# Patient Record
Sex: FEMALE | Race: WHITE | NOT HISPANIC OR LATINO | URBAN - METROPOLITAN AREA
[De-identification: names, ages, dates, MRNs, and addresses within clinical notes are randomized per-mention and may not be internally consistent; named-entity substitution may affect disease eponyms.]

---

## 2020-07-16 ENCOUNTER — OFFICE VISIT (OUTPATIENT)
Dept: URBAN - METROPOLITAN AREA CLINIC 63 | Facility: CLINIC | Age: 70
End: 2020-07-16

## 2022-07-09 ENCOUNTER — TELEPHONE ENCOUNTER (OUTPATIENT)
Dept: URBAN - METROPOLITAN AREA CLINIC 121 | Facility: CLINIC | Age: 72
End: 2022-07-09

## 2022-07-09 RX ORDER — ALBUTEROL SULFATE 90 UG/1
AEROSOL, METERED RESPIRATORY (INHALATION)
Refills: 0 | OUTPATIENT
Start: 2019-03-13 | End: 2020-02-28

## 2022-07-09 RX ORDER — OMEPRAZOLE 40 MG/1
CAPSULE, DELAYED RELEASE ORAL ONCE A DAY
Refills: 0 | OUTPATIENT
Start: 2020-02-15 | End: 2020-02-28

## 2022-07-09 RX ORDER — DOXYCYCLINE HYCLATE 100 MG/1
TABLET ORAL ONCE A DAY
Refills: 0 | OUTPATIENT
Start: 2020-02-25 | End: 2020-02-28

## 2022-07-09 RX ORDER — BACLOFEN 20 MG/1
TABLET ORAL ONCE A DAY
Refills: 0 | OUTPATIENT
Start: 2020-02-18 | End: 2020-02-28

## 2022-07-09 RX ORDER — BACLOFEN 20 MG/1
TABLET ORAL ONCE A DAY
Refills: 0 | OUTPATIENT
Start: 2020-02-28 | End: 2020-02-28

## 2022-07-09 RX ORDER — SIMVASTATIN 20 MG/1
TABLET, FILM COATED ORAL ONCE A DAY
Refills: 0 | OUTPATIENT
Start: 2020-02-14 | End: 2020-02-28

## 2022-07-09 RX ORDER — LOSARTAN POTASSIUM 50 MG/1
TABLET, FILM COATED ORAL ONCE A DAY
Refills: 0 | OUTPATIENT
Start: 2019-12-11 | End: 2020-02-28

## 2022-07-10 ENCOUNTER — TELEPHONE ENCOUNTER (OUTPATIENT)
Dept: URBAN - METROPOLITAN AREA CLINIC 121 | Facility: CLINIC | Age: 72
End: 2022-07-10

## 2022-07-10 RX ORDER — DOXYCYCLINE HYCLATE 100 MG/1
TABLET ORAL TWICE A DAY
Refills: 0 | Status: ACTIVE | COMMUNITY
Start: 2020-02-25

## 2022-07-10 RX ORDER — PREDNISONE 20 MG/1
TABLET ORAL ONCE A DAY
Refills: 0 | Status: ACTIVE | COMMUNITY
Start: 2020-02-25

## 2022-07-10 RX ORDER — ALBUTEROL SULFATE 90 UG/1
AEROSOL, METERED RESPIRATORY (INHALATION)
Refills: 0 | Status: ACTIVE | COMMUNITY
Start: 2020-02-28

## 2022-07-10 RX ORDER — LOSARTAN POTASSIUM 50 MG/1
TABLET, FILM COATED ORAL ONCE A DAY
Refills: 0 | Status: ACTIVE | COMMUNITY
Start: 2020-02-28

## 2022-07-10 RX ORDER — SIMVASTATIN 20 MG/1
TABLET, FILM COATED ORAL ONCE A DAY
Refills: 0 | Status: ACTIVE | COMMUNITY
Start: 2020-02-28

## 2022-07-10 RX ORDER — BACLOFEN 20 MG/1
TABLET ORAL AS NEEDED
Refills: 0 | Status: ACTIVE | COMMUNITY
Start: 2020-02-28

## 2022-07-10 RX ORDER — OMEPRAZOLE 40 MG/1
CAPSULE, DELAYED RELEASE ORAL ONCE A DAY
Refills: 0 | Status: ACTIVE | COMMUNITY
Start: 2020-02-28

## 2023-11-07 ENCOUNTER — OFFICE VISIT (OUTPATIENT)
Dept: URBAN - METROPOLITAN AREA CLINIC 60 | Facility: CLINIC | Age: 73
End: 2023-11-07
Payer: COMMERCIAL

## 2023-11-07 VITALS
HEIGHT: 64 IN | SYSTOLIC BLOOD PRESSURE: 140 MMHG | HEART RATE: 57 BPM | WEIGHT: 143 LBS | BODY MASS INDEX: 24.41 KG/M2 | OXYGEN SATURATION: 99 % | DIASTOLIC BLOOD PRESSURE: 66 MMHG | TEMPERATURE: 97.2 F | RESPIRATION RATE: 12 BRPM

## 2023-11-07 DIAGNOSIS — R93.3 GASTROINTESTINAL TRACT IMAGING ABNORMALITY: ICD-10-CM

## 2023-11-07 DIAGNOSIS — K74.69 OTHER CIRRHOSIS OF LIVER: ICD-10-CM

## 2023-11-07 DIAGNOSIS — K21.9 GASTROESOPHAGEAL REFLUX DISEASE WITHOUT ESOPHAGITIS: ICD-10-CM

## 2023-11-07 DIAGNOSIS — Z90.49 STATUS POST PARTIAL RESECTION OF COLON: ICD-10-CM

## 2023-11-07 DIAGNOSIS — Z12.11 SCREEN FOR COLON CANCER: ICD-10-CM

## 2023-11-07 PROBLEM — 442182001: Status: ACTIVE | Noted: 2023-11-07

## 2023-11-07 PROBLEM — 266435005: Status: ACTIVE | Noted: 2023-11-07

## 2023-11-07 PROBLEM — 19943007: Status: ACTIVE | Noted: 2023-11-07

## 2023-11-07 PROBLEM — 305058001: Status: ACTIVE | Noted: 2023-11-07

## 2023-11-07 PROBLEM — 428305005: Status: ACTIVE | Noted: 2023-11-07

## 2023-11-07 PROCEDURE — 99204 OFFICE O/P NEW MOD 45 MIN: CPT | Performed by: INTERNAL MEDICINE

## 2023-11-07 RX ORDER — OMEPRAZOLE 40 MG/1
CAPSULE, DELAYED RELEASE ORAL
Qty: 90 APPLICATOR | Refills: 1 | Status: ACTIVE | COMMUNITY

## 2023-11-07 RX ORDER — DULAGLUTIDE 1.5 MG/.5ML
INJECT 0.5 ML INTO THE SKIN EVERY 7 DAYS INJECTION, SOLUTION SUBCUTANEOUS
Qty: 6 MILLILITER | Refills: 0 | Status: ACTIVE | COMMUNITY

## 2023-11-07 RX ORDER — EVOLOCUMAB 140 MG/ML
INJECT 1 ML INTO THE SKIN EVERY 14 DAYS INJECTION, SOLUTION SUBCUTANEOUS
Qty: 6 MILLILITER | Refills: 0 | Status: ACTIVE | COMMUNITY

## 2023-11-07 RX ORDER — LOSARTAN POTASSIUM 50 MG/1
TABLET, FILM COATED ORAL ONCE A DAY
Refills: 0 | Status: ACTIVE | COMMUNITY
Start: 2020-02-28

## 2023-11-07 RX ORDER — ALBUTEROL SULFATE 90 UG/1
AEROSOL, METERED RESPIRATORY (INHALATION)
Refills: 0 | Status: ACTIVE | COMMUNITY
Start: 2020-02-28

## 2023-11-07 RX ORDER — MECLIZINE 12.5 MG/1
TAKE 1 TABLET BY MOUTH EVERY 6 HOURS AS NEEDED FOR DIZZINESS FOR UP TO 30 DAYS TABLET ORAL
Qty: 30 EACH | Refills: 0 | Status: ACTIVE | COMMUNITY

## 2023-11-07 RX ORDER — VALACYCLOVIR HYDROCHLORIDE 1 G/1
TABLET, FILM COATED ORAL
Qty: 21 TABLET | Refills: 0 | Status: ACTIVE | COMMUNITY

## 2023-11-07 RX ORDER — MONTELUKAST SODIUM 10 MG/1
TAKE 1 TABLET BY MOUTH NIGHTLY TABLET, FILM COATED ORAL
Qty: 90 EACH | Refills: 1 | Status: ACTIVE | COMMUNITY

## 2023-11-07 RX ORDER — BUDESONIDE AND FORMOTEROL FUMARATE DIHYDRATE 160; 4.5 UG/1; UG/1
AEROSOL RESPIRATORY (INHALATION)
Qty: 30.6 GRAM | Refills: 1 | Status: ACTIVE | COMMUNITY

## 2023-11-07 RX ORDER — FLUTICASONE PROPIONATE 50 UG/1
SPRAY, METERED NASAL
Qty: 16 GRAM | Refills: 0 | Status: ACTIVE | COMMUNITY

## 2023-11-07 RX ORDER — GABAPENTIN 300 MG/1
TAKE 1 BY MOUTH 3 TIMES A DAY CAPSULE ORAL
Qty: 270 EACH | Refills: 1 | Status: ACTIVE | COMMUNITY

## 2023-11-07 RX ORDER — BACLOFEN 20 MG/1
TABLET ORAL AS NEEDED
Refills: 0 | Status: ACTIVE | COMMUNITY
Start: 2020-02-28

## 2023-11-07 RX ORDER — EPINEPHRINE 0.3 MG/.3ML
INJECT 1 (ONE) SYRINGE AS NEEDED FOR SEVERE REACTION INJECTION INTRAMUSCULAR
Qty: 2 EACH | Refills: 1 | Status: ACTIVE | COMMUNITY

## 2023-11-07 NOTE — HPI-TODAY'S VISIT:
Virginia is a pleasant 73-year-old female who was last seen in 2020. She has a history of chronic reflux, remote history of diverticulitis in 2007 that led to a segmental colon resection after colonoscopy. History of cholecystectomy, fatty liver and pancreatic calcification on imaging. She has been referred here for the management of her liver disease. Ultrasound elastography noted S3/F4 She was last seen in 2020. At that time she was dealing with significant recurrent sinus infections and chronic cough and was felt to be at high risk for endoscopy with sedation. We obtained a barium swallow which was unremarkable and requested her recently done Cologuard test results. She was lost to follow-up  Referred here for evaluation of cirrhosis. She had an MRI in 2021 that was also suspicious for cirrhosis with no focal hepatic lesions and also noted was coarse calcifications of pancreas suggesting chronic pancreatitis. She recalls having 1 episode of pancreatitis a few years ago. She denies any history of alcohol abuse. She drinks only at [employer] celebration. She blames Her diabetes and pancreatitis and now cirrhosis on COVID Is possible she developed diabetes from her chronic pancreatitis which then led to her fatty liver and nonalcoholic cirrhosis She denies any active gastrointestinal complaints except for mild epigastric discomfort. Denies any dysphagia except to pills. Denies any rectal bleeding melena constipation or diarrhea or any unintentional weight loss loss of appetite. Denies any chronic abdominal pain or bloating or diarrhea. No family history of pancreatitis or cirrhosis.

## 2023-11-07 NOTE — HPI-PREVIOUS IMAGING
Ultrasound elastography 10/2023: S3/F4  MRI abdomen with and without contrast July 2021: 18.2 cm liver span, moderate diffuse hepatic steatosis, mild surface nodularity suspicious for cirrhosis, no focal hepatic lesions, subcentimeter focus of heterogeneous early arterial enhancement in the left lobe segment 3 consistent with transient vascular shunting. Cholecystectomy, mild splenomegaly MRI abdomen with and without contrast July 2021: Pancreas is diffusely atrophic with coarse calcifications consistent with sequela of chronic pancreatitis. MRI abdomen with and without contrast July 2021: Mild inflammatory changes at the splenic flexure suggesting mild diverticulitis.  CAT scan without contrast 2018: Fatty liver, pancreatic calcification, cholecystectomy Barium swallow 2020: Small to moderate-sized hiatal hernia, no stricture, barium tablet passes freely through the GE junction

## 2023-11-07 NOTE — HPI-PREVIOUS LABS
Labs August 2023: Normal electrolytes, normal renal function, serum albumin 4.3, total bilirubin 0.5, AST/ALT 40/50, alkaline phosphatase 153, serum albumin 4.3 Hemoglobin A1c 6.4, total cholesterol 128, triglycerides 212 (H), LDL 49, TSH 0.693 (normal), Labs March 2023: WBC 5.4, hemoglobin 14.4 g, MCV 89, platelets 94 (L)  Hep C antibody is nonreactive in 2021

## 2023-11-07 NOTE — PHYSICAL EXAM GASTROINTESTINAL
soft, nontender, nondistended , no masses palpable , normal bowel sounds , Liver felt 5 cm below rcm - tender/ no ascites / no asterixis/ some dilated veins visualized on the skin of hrr abdomen

## 2023-11-14 ENCOUNTER — LAB OUTSIDE AN ENCOUNTER (OUTPATIENT)
Dept: URBAN - METROPOLITAN AREA CLINIC 60 | Facility: CLINIC | Age: 73
End: 2023-11-14

## 2024-01-08 LAB
A/G RATIO: 1.5
ABSOLUTE BASOPHILS: 59
ABSOLUTE EOSINOPHILS: 100
ABSOLUTE LYMPHOCYTES: 1422
ABSOLUTE MONOCYTES: 454
ABSOLUTE NEUTROPHILS: 3865
ACTIN (SMOOTH MUSCLE) ANTIBODY (IGG): <20
AFP, SERUM, TUMOR MARKER: 3.3
ALBUMIN: 4.3
ALKALINE PHOSPHATASE: 109
ALT (SGPT): 42
ANA SCREEN, IFA: NEGATIVE
AST (SGOT): 40
BASOPHILS: 1
BILIRUBIN, TOTAL: 0.7
BUN/CREATININE RATIO: (no result)
BUN: 12
CALCIUM: 10.1
CARBON DIOXIDE, TOTAL: 28
CHLORIDE: 100
CHOL/HDLC RATIO: 2.2
CHOLESTEROL, TOTAL: 103
CREATININE: 0.87
EGFR: 70
EOSINOPHILS: 1.7
FERRITIN, SERUM: 47
GLOBULIN, TOTAL: 2.9
GLUCOSE: 215
HDL CHOLESTEROL: 47
HEMATOCRIT: 43.8
HEMOGLOBIN A1C: 9
HEMOGLOBIN: 15
HEP B CORE AB, IGM: (no result)
HEPATITIS A AB, TOTAL: REACTIVE
HEPATITIS B SURFACE ANTIGEN: (no result)
HEPATITIS C ANTIBODY: (no result)
INR: 1.1
IRON BIND.CAP.(TIBC): 385
IRON SATURATION: 20
IRON: 76
LDL CHOLESTEROL CALC: 33
LKM-1 ANTIBODY (IGG): <=20
LYMPHOCYTES: 24.1
MCH: 30.7
MCHC: 34.2
MCV: 89.6
MITOCHONDRIAL (M2) ANTIBODY: <=20
MONOCYTES: 7.7
MPV: 12.9
NEUTROPHILS: 65.5
NON HDL CHOLESTEROL: 56
PLATELET COUNT: 93
POTASSIUM: 4.3
PROTEIN, TOTAL: 7.2
PT: 11
RDW: 12.3
RED BLOOD CELL COUNT: 4.89
RHEUMATOID FACTOR: <14
SJOGREN'S ANTIBODY (SS-A): (no result)
SJOGREN'S ANTIBODY (SS-B): (no result)
SODIUM: 139
TRIGLYCERIDES: 146
WHITE BLOOD CELL COUNT: 5.9

## 2024-01-11 ENCOUNTER — OFFICE VISIT (OUTPATIENT)
Dept: URBAN - METROPOLITAN AREA CLINIC 63 | Facility: CLINIC | Age: 74
End: 2024-01-11
Payer: COMMERCIAL

## 2024-01-11 ENCOUNTER — LAB OUTSIDE AN ENCOUNTER (OUTPATIENT)
Dept: URBAN - METROPOLITAN AREA CLINIC 63 | Facility: CLINIC | Age: 74
End: 2024-01-11

## 2024-01-11 VITALS
TEMPERATURE: 96.7 F | HEIGHT: 64 IN | HEART RATE: 70 BPM | WEIGHT: 143 LBS | OXYGEN SATURATION: 96 % | BODY MASS INDEX: 24.41 KG/M2 | DIASTOLIC BLOOD PRESSURE: 65 MMHG | SYSTOLIC BLOOD PRESSURE: 135 MMHG

## 2024-01-11 DIAGNOSIS — Z12.11 SCREEN FOR COLON CANCER: ICD-10-CM

## 2024-01-11 DIAGNOSIS — K74.69 OTHER CIRRHOSIS OF LIVER: ICD-10-CM

## 2024-01-11 DIAGNOSIS — R93.3 GASTROINTESTINAL TRACT IMAGING ABNORMALITY: ICD-10-CM

## 2024-01-11 DIAGNOSIS — K21.9 GASTROESOPHAGEAL REFLUX DISEASE WITHOUT ESOPHAGITIS: ICD-10-CM

## 2024-01-11 DIAGNOSIS — Z90.49 STATUS POST PARTIAL RESECTION OF COLON: ICD-10-CM

## 2024-01-11 PROBLEM — 235953007: Status: ACTIVE | Noted: 2024-01-11

## 2024-01-11 PROCEDURE — 99215 OFFICE O/P EST HI 40 MIN: CPT | Performed by: INTERNAL MEDICINE

## 2024-01-11 RX ORDER — GABAPENTIN 300 MG/1
TAKE 1 BY MOUTH 3 TIMES A DAY CAPSULE ORAL
Qty: 270 EACH | Refills: 1 | Status: ACTIVE | COMMUNITY

## 2024-01-11 RX ORDER — VALACYCLOVIR HYDROCHLORIDE 1 G/1
TABLET, FILM COATED ORAL
Qty: 21 TABLET | Refills: 0 | Status: ACTIVE | COMMUNITY

## 2024-01-11 RX ORDER — ALBUTEROL SULFATE 90 UG/1
AEROSOL, METERED RESPIRATORY (INHALATION)
Refills: 0 | Status: ACTIVE | COMMUNITY
Start: 2020-02-28

## 2024-01-11 RX ORDER — DULAGLUTIDE 1.5 MG/.5ML
INJECT 0.5 ML INTO THE SKIN EVERY 7 DAYS INJECTION, SOLUTION SUBCUTANEOUS
Qty: 6 MILLILITER | Refills: 0 | Status: ACTIVE | COMMUNITY

## 2024-01-11 RX ORDER — MONTELUKAST SODIUM 10 MG/1
TAKE 1 TABLET BY MOUTH NIGHTLY TABLET, FILM COATED ORAL
Qty: 90 EACH | Refills: 1 | Status: ACTIVE | COMMUNITY

## 2024-01-11 RX ORDER — BUDESONIDE AND FORMOTEROL FUMARATE DIHYDRATE 160; 4.5 UG/1; UG/1
AEROSOL RESPIRATORY (INHALATION)
Qty: 30.6 GRAM | Refills: 1 | Status: ACTIVE | COMMUNITY

## 2024-01-11 RX ORDER — MECLIZINE 12.5 MG/1
TAKE 1 TABLET BY MOUTH EVERY 6 HOURS AS NEEDED FOR DIZZINESS FOR UP TO 30 DAYS TABLET ORAL
Qty: 30 EACH | Refills: 0 | Status: ACTIVE | COMMUNITY

## 2024-01-11 RX ORDER — SOD SULF/POT CHLORIDE/MAG SULF 1.479 G
12 TABLETS TABLET ORAL
Qty: 24 | Refills: 0 | OUTPATIENT
Start: 2024-01-11 | End: 2024-01-12

## 2024-01-11 RX ORDER — EVOLOCUMAB 140 MG/ML
INJECT 1 ML INTO THE SKIN EVERY 14 DAYS INJECTION, SOLUTION SUBCUTANEOUS
Qty: 6 MILLILITER | Refills: 0 | Status: ACTIVE | COMMUNITY

## 2024-01-11 RX ORDER — BACLOFEN 20 MG/1
TABLET ORAL AS NEEDED
Refills: 0 | Status: ACTIVE | COMMUNITY
Start: 2020-02-28

## 2024-01-11 RX ORDER — OMEPRAZOLE 40 MG/1
CAPSULE, DELAYED RELEASE ORAL
Qty: 90 APPLICATOR | Refills: 1 | Status: ACTIVE | COMMUNITY

## 2024-01-11 RX ORDER — EPINEPHRINE 0.3 MG/.3ML
INJECT 1 (ONE) SYRINGE AS NEEDED FOR SEVERE REACTION INJECTION INTRAMUSCULAR
Qty: 2 EACH | Refills: 1 | Status: ACTIVE | COMMUNITY

## 2024-01-11 RX ORDER — FLUTICASONE PROPIONATE 50 UG/1
SPRAY, METERED NASAL
Qty: 16 GRAM | Refills: 0 | Status: ACTIVE | COMMUNITY

## 2024-01-11 RX ORDER — LOSARTAN POTASSIUM 50 MG/1
TABLET, FILM COATED ORAL ONCE A DAY
Refills: 0 | Status: ACTIVE | COMMUNITY
Start: 2020-02-28

## 2024-01-11 NOTE — HPI-TODAY'S VISIT:
Virginia is a pleasant 73-year-old female who was last seen in 2020. 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 She had extensive serological workup which was reviewed-refer below. And unremarkable except for thrombocytopenia. Imaging studies note no ascites and evidence of chronic pancreatitis. She takes Trulicity for her diabetes. She reports symptoms of feeling full, decreased appetite and feels like " everything is pushing up" . Reports intermittent constipation and she takes magnesium citrate which apparently does not seem to be working right now. She is not taking herbal teas with good response. MiraLAX did not help. Eventually will need to be on lactulose. Denies any chronic abdominal pain or bloating or diarrhea. She also reports seeing some bright red blood with bowel movements. Describes intermittent dysphagia to both solids and liquids that is nonprogressive. barium swallow - neg in 2020 Ultrasound January 2024 as noted below.  She has a history of chronic reflux, remote history of diverticulitis in 2007 that led to a segmental colon resection after colonoscopy. History of cholecystectomy, fatty liver and pancreatic calcification on imaging.  Ultrasound elastography noted S3/F4  Cologuard test results not available.    MRI in 2021 that was also suspicious for cirrhosis with no focal hepatic lesions and also noted was coarse calcifications of pancreas suggesting chronic pancreatitis. She recalls having 1 episode of pancreatitis a few years ago. She denies any history of alcohol abuse. She drinks only at [employer] Year[employer] celebration. She blames Her diabetes and pancreatitis and now cirrhosis on COVID Is possible she developed diabetes from her chronic pancreatitis which then led to her fatty liver and nonalcoholic cirrhosis No family history of pancreatitis or cirrhosis.

## 2024-01-11 NOTE — HPI-PREVIOUS LABS
Labs January 2024: WBC 5.9, hemoglobin 15 g, MCV 89, platelets 93 (L), Serum iron 76, TIBC 3 8520% saturation, ferritin 47 BUN 12 creatinine 0.87, Total bilirubin 0.7, AST 40 (H), ALT 42 (H), alkaline phosphatase 109, serum albumin 4.3, prothrombin time 11.0, INR 1.1, Alpha-fetoprotein 3.3, Autoimmune serologies including SHANEKA, AMA, ASMA, anti-LK M all unremarkable Immune to hepatitis A, hepatitis B and C negative Hemoglobin A1c 9.0 (H), normal lipid profile except for a HDL of 47 (L).   Labs August 2023: Normal electrolytes, normal renal function, serum albumin 4.3, total bilirubin 0.5, AST/ALT 40/50, alkaline phosphatase 153, serum albumin 4.3 Hemoglobin A1c 6.4, total cholesterol 128, triglycerides 212 (H), LDL 49, TSH 0.693 (normal), Labs March 2023: WBC 5.4, hemoglobin 14.4 g, MCV 89, platelets 94 (L)  Hep C antibody is nonreactive in 2021

## 2024-01-11 NOTE — HPI-PREVIOUS IMAGING
Ultrasound abdomen January 2024: Cholecystectomy, normal ducts, CBD 6 mm, borderline enlarged liver, hyperechoic echogenicity, heterogeneous, nodular shape, liver span 17.9 cm, splenomegaly (15.1 cm)  Ultrasound elastography 10/2023: S3/F4  MRI abdomen with and without contrast July 2021: 18.2 cm liver span, moderate diffuse hepatic steatosis, mild surface nodularity suspicious for cirrhosis, no focal hepatic lesions, subcentimeter focus of heterogeneous early arterial enhancement in the left lobe segment 3 consistent with transient vascular shunting. Cholecystectomy, mild splenomegaly MRI abdomen with and without contrast July 2021: Pancreas is diffusely atrophic with coarse calcifications consistent with sequela of chronic pancreatitis. MRI abdomen with and without contrast July 2021: Mild inflammatory changes at the splenic flexure suggesting mild diverticulitis.  CAT scan without contrast 2018: Fatty liver, pancreatic calcification, cholecystectomy Barium swallow 2020: Small to moderate-sized hiatal hernia, no stricture, barium tablet passes freely through the GE junction

## 2024-01-11 NOTE — PHYSICAL EXAM GASTROINTESTINAL
soft, nontender, nondistended , no masses palpable , normal bowel sounds , Liver felt 4 cm below rcm - tender/ no ascites / NO FLUID THRILL/ some dilated veins visualized on the skin of hrr abdomen

## 2024-01-18 ENCOUNTER — LAB OUTSIDE AN ENCOUNTER (OUTPATIENT)
Dept: URBAN - METROPOLITAN AREA CLINIC 63 | Facility: CLINIC | Age: 74
End: 2024-01-18

## 2024-01-27 LAB
IGG, SUBCLASS 1: 465
IGG, SUBCLASS 2: 321
IGG, SUBCLASS 3: 24
IGG, SUBCLASS 4: 34.3
IMMUNOGLOBULIN G, QN, SERUM: 851

## 2024-02-07 ENCOUNTER — COLON (OUTPATIENT)
Dept: URBAN - METROPOLITAN AREA SURGERY CENTER 4 | Facility: SURGERY CENTER | Age: 74
End: 2024-02-07
Payer: COMMERCIAL

## 2024-02-07 ENCOUNTER — LAB (OUTPATIENT)
Dept: URBAN - METROPOLITAN AREA CLINIC 4 | Facility: CLINIC | Age: 74
End: 2024-02-07
Payer: COMMERCIAL

## 2024-02-07 DIAGNOSIS — K57.30 DIVERTICULOSIS OF LARGE INTESTINE WITHOUT PERFORATION OR ABSCESS WITHOUT BLEEDING: ICD-10-CM

## 2024-02-07 DIAGNOSIS — Z12.11 ENCOUNTER FOR SCREENING FOR MALIGNANT NEOPLASM OF COLON: ICD-10-CM

## 2024-02-07 DIAGNOSIS — K63.5 POLYP OF ASCENDING COLON, UNSPECIFIED TYPE: ICD-10-CM

## 2024-02-07 DIAGNOSIS — D12.5 BENIGN NEOPLASM OF SIGMOID COLON: ICD-10-CM

## 2024-02-07 DIAGNOSIS — K64.2 THIRD DEGREE HEMORRHOIDS: ICD-10-CM

## 2024-02-07 PROCEDURE — 45385 COLONOSCOPY W/LESION REMOVAL: CPT | Performed by: INTERNAL MEDICINE

## 2024-02-07 PROCEDURE — 88305 TISSUE EXAM BY PATHOLOGIST: CPT | Performed by: PATHOLOGY

## 2024-02-07 PROCEDURE — 45380 COLONOSCOPY AND BIOPSY: CPT | Performed by: INTERNAL MEDICINE

## 2024-02-07 RX ORDER — MONTELUKAST SODIUM 10 MG/1
TAKE 1 TABLET BY MOUTH NIGHTLY TABLET, FILM COATED ORAL
Qty: 90 EACH | Refills: 1 | Status: ACTIVE | COMMUNITY

## 2024-02-07 RX ORDER — EPINEPHRINE 0.3 MG/.3ML
INJECT 1 (ONE) SYRINGE AS NEEDED FOR SEVERE REACTION INJECTION INTRAMUSCULAR
Qty: 2 EACH | Refills: 1 | Status: ACTIVE | COMMUNITY

## 2024-02-07 RX ORDER — GABAPENTIN 300 MG/1
TAKE 1 BY MOUTH 3 TIMES A DAY CAPSULE ORAL
Qty: 270 EACH | Refills: 1 | Status: ACTIVE | COMMUNITY

## 2024-02-07 RX ORDER — OMEPRAZOLE 40 MG/1
CAPSULE, DELAYED RELEASE ORAL
Qty: 90 APPLICATOR | Refills: 1 | Status: ACTIVE | COMMUNITY

## 2024-02-07 RX ORDER — LOSARTAN POTASSIUM 50 MG/1
TABLET, FILM COATED ORAL ONCE A DAY
Refills: 0 | Status: ACTIVE | COMMUNITY
Start: 2020-02-28

## 2024-02-07 RX ORDER — BACLOFEN 20 MG/1
TABLET ORAL AS NEEDED
Refills: 0 | Status: ACTIVE | COMMUNITY
Start: 2020-02-28

## 2024-02-07 RX ORDER — DULAGLUTIDE 1.5 MG/.5ML
INJECT 0.5 ML INTO THE SKIN EVERY 7 DAYS INJECTION, SOLUTION SUBCUTANEOUS
Qty: 6 MILLILITER | Refills: 0 | Status: ACTIVE | COMMUNITY

## 2024-02-07 RX ORDER — VALACYCLOVIR HYDROCHLORIDE 1 G/1
TABLET, FILM COATED ORAL
Qty: 21 TABLET | Refills: 0 | Status: ACTIVE | COMMUNITY

## 2024-02-07 RX ORDER — BUDESONIDE AND FORMOTEROL FUMARATE DIHYDRATE 160; 4.5 UG/1; UG/1
AEROSOL RESPIRATORY (INHALATION)
Qty: 30.6 GRAM | Refills: 1 | Status: ACTIVE | COMMUNITY

## 2024-02-07 RX ORDER — ALBUTEROL SULFATE 90 UG/1
AEROSOL, METERED RESPIRATORY (INHALATION)
Refills: 0 | Status: ACTIVE | COMMUNITY
Start: 2020-02-28

## 2024-02-07 RX ORDER — MECLIZINE 12.5 MG/1
TAKE 1 TABLET BY MOUTH EVERY 6 HOURS AS NEEDED FOR DIZZINESS FOR UP TO 30 DAYS TABLET ORAL
Qty: 30 EACH | Refills: 0 | Status: ACTIVE | COMMUNITY

## 2024-02-07 RX ORDER — EVOLOCUMAB 140 MG/ML
INJECT 1 ML INTO THE SKIN EVERY 14 DAYS INJECTION, SOLUTION SUBCUTANEOUS
Qty: 6 MILLILITER | Refills: 0 | Status: ACTIVE | COMMUNITY

## 2024-02-07 RX ORDER — FLUTICASONE PROPIONATE 50 UG/1
SPRAY, METERED NASAL
Qty: 16 GRAM | Refills: 0 | Status: ACTIVE | COMMUNITY

## 2024-02-26 ENCOUNTER — EUS (OUTPATIENT)
Dept: URBAN - METROPOLITAN AREA MEDICAL CENTER 3 | Facility: MEDICAL CENTER | Age: 74
End: 2024-02-26

## 2024-02-26 RX ORDER — BUDESONIDE AND FORMOTEROL FUMARATE DIHYDRATE 160; 4.5 UG/1; UG/1
AEROSOL RESPIRATORY (INHALATION)
Qty: 30.6 GRAM | Refills: 1 | Status: ACTIVE | COMMUNITY

## 2024-02-26 RX ORDER — MECLIZINE 12.5 MG/1
TAKE 1 TABLET BY MOUTH EVERY 6 HOURS AS NEEDED FOR DIZZINESS FOR UP TO 30 DAYS TABLET ORAL
Qty: 30 EACH | Refills: 0 | Status: ACTIVE | COMMUNITY

## 2024-02-26 RX ORDER — EVOLOCUMAB 140 MG/ML
INJECT 1 ML INTO THE SKIN EVERY 14 DAYS INJECTION, SOLUTION SUBCUTANEOUS
Qty: 6 MILLILITER | Refills: 0 | Status: ACTIVE | COMMUNITY

## 2024-02-26 RX ORDER — VALACYCLOVIR HYDROCHLORIDE 1 G/1
TABLET, FILM COATED ORAL
Qty: 21 TABLET | Refills: 0 | Status: ACTIVE | COMMUNITY

## 2024-02-26 RX ORDER — BACLOFEN 20 MG/1
TABLET ORAL AS NEEDED
Refills: 0 | Status: ACTIVE | COMMUNITY
Start: 2020-02-28

## 2024-02-26 RX ORDER — DULAGLUTIDE 1.5 MG/.5ML
INJECT 0.5 ML INTO THE SKIN EVERY 7 DAYS INJECTION, SOLUTION SUBCUTANEOUS
Qty: 6 MILLILITER | Refills: 0 | Status: ACTIVE | COMMUNITY

## 2024-02-26 RX ORDER — ALBUTEROL SULFATE 90 UG/1
AEROSOL, METERED RESPIRATORY (INHALATION)
Refills: 0 | Status: ACTIVE | COMMUNITY
Start: 2020-02-28

## 2024-02-26 RX ORDER — MONTELUKAST SODIUM 10 MG/1
TAKE 1 TABLET BY MOUTH NIGHTLY TABLET, FILM COATED ORAL
Qty: 90 EACH | Refills: 1 | Status: ACTIVE | COMMUNITY

## 2024-02-26 RX ORDER — GABAPENTIN 300 MG/1
TAKE 1 BY MOUTH 3 TIMES A DAY CAPSULE ORAL
Qty: 270 EACH | Refills: 1 | Status: ACTIVE | COMMUNITY

## 2024-02-26 RX ORDER — EPINEPHRINE 0.3 MG/.3ML
INJECT 1 (ONE) SYRINGE AS NEEDED FOR SEVERE REACTION INJECTION INTRAMUSCULAR
Qty: 2 EACH | Refills: 1 | Status: ACTIVE | COMMUNITY

## 2024-02-26 RX ORDER — LOSARTAN POTASSIUM 50 MG/1
TABLET, FILM COATED ORAL ONCE A DAY
Refills: 0 | Status: ACTIVE | COMMUNITY
Start: 2020-02-28

## 2024-02-26 RX ORDER — OMEPRAZOLE 40 MG/1
CAPSULE, DELAYED RELEASE ORAL
Qty: 90 APPLICATOR | Refills: 1 | Status: ACTIVE | COMMUNITY

## 2024-02-26 RX ORDER — FLUTICASONE PROPIONATE 50 UG/1
SPRAY, METERED NASAL
Qty: 16 GRAM | Refills: 0 | Status: ACTIVE | COMMUNITY

## 2024-03-21 ENCOUNTER — OV EP (OUTPATIENT)
Dept: URBAN - METROPOLITAN AREA CLINIC 63 | Facility: CLINIC | Age: 74
End: 2024-03-21
Payer: COMMERCIAL

## 2024-03-21 VITALS
DIASTOLIC BLOOD PRESSURE: 64 MMHG | HEART RATE: 84 BPM | SYSTOLIC BLOOD PRESSURE: 122 MMHG | TEMPERATURE: 97.9 F | HEIGHT: 64 IN | OXYGEN SATURATION: 97 % | BODY MASS INDEX: 23.56 KG/M2 | WEIGHT: 138 LBS

## 2024-03-21 DIAGNOSIS — Z90.49 STATUS POST PARTIAL RESECTION OF COLON: ICD-10-CM

## 2024-03-21 DIAGNOSIS — K74.69 OTHER CIRRHOSIS OF LIVER: ICD-10-CM

## 2024-03-21 DIAGNOSIS — K21.9 GASTROESOPHAGEAL REFLUX DISEASE WITHOUT ESOPHAGITIS: ICD-10-CM

## 2024-03-21 DIAGNOSIS — R93.3 GASTROINTESTINAL TRACT IMAGING ABNORMALITY: ICD-10-CM

## 2024-03-21 DIAGNOSIS — K86.1 IDIOPATHIC CHRONIC PANCREATITIS: ICD-10-CM

## 2024-03-21 DIAGNOSIS — Z12.11 SCREEN FOR COLON CANCER: ICD-10-CM

## 2024-03-21 PROCEDURE — 99214 OFFICE O/P EST MOD 30 MIN: CPT | Performed by: INTERNAL MEDICINE

## 2024-03-21 RX ORDER — EMPAGLIFLOZIN 25 MG/1
1 TABLET TABLET, FILM COATED ORAL ONCE A DAY
Status: ACTIVE | COMMUNITY

## 2024-03-21 RX ORDER — LOSARTAN POTASSIUM 50 MG/1
TABLET, FILM COATED ORAL ONCE A DAY
Refills: 0 | Status: ACTIVE | COMMUNITY
Start: 2020-02-28

## 2024-03-21 RX ORDER — ALBUTEROL SULFATE 90 UG/1
AEROSOL, METERED RESPIRATORY (INHALATION)
Refills: 0 | Status: ACTIVE | COMMUNITY
Start: 2020-02-28

## 2024-03-21 RX ORDER — PANCRELIPASE 36000; 180000; 114000 [USP'U]/1; [USP'U]/1; [USP'U]/1
2 PILLS WITH MEAL AND 1 PILL WITH SNACK CAPSULE, DELAYED RELEASE PELLETS ORAL AS NEEDED
Qty: 90 | Refills: 2 | OUTPATIENT
Start: 2024-03-21 | End: 2024-06-19

## 2024-03-21 RX ORDER — MONTELUKAST SODIUM 10 MG/1
TAKE 1 TABLET BY MOUTH NIGHTLY TABLET, FILM COATED ORAL
Qty: 90 EACH | Refills: 1 | Status: ACTIVE | COMMUNITY

## 2024-03-21 RX ORDER — OMEPRAZOLE 40 MG/1
CAPSULE, DELAYED RELEASE ORAL
Qty: 90 APPLICATOR | Refills: 1 | Status: ACTIVE | COMMUNITY

## 2024-03-21 RX ORDER — EVOLOCUMAB 140 MG/ML
INJECT 1 ML INTO THE SKIN EVERY 14 DAYS INJECTION, SOLUTION SUBCUTANEOUS
Qty: 6 MILLILITER | Refills: 0 | Status: ACTIVE | COMMUNITY

## 2024-03-21 RX ORDER — MECLIZINE 12.5 MG/1
TAKE 1 TABLET BY MOUTH EVERY 6 HOURS AS NEEDED FOR DIZZINESS FOR UP TO 30 DAYS TABLET ORAL
Qty: 30 EACH | Refills: 0 | Status: ACTIVE | COMMUNITY

## 2024-03-21 RX ORDER — BACLOFEN 20 MG/1
TABLET ORAL AS NEEDED
Refills: 0 | Status: ACTIVE | COMMUNITY
Start: 2020-02-28

## 2024-03-21 RX ORDER — EPINEPHRINE 0.3 MG/.3ML
INJECT 1 (ONE) SYRINGE AS NEEDED FOR SEVERE REACTION INJECTION INTRAMUSCULAR
Qty: 2 EACH | Refills: 1 | Status: ACTIVE | COMMUNITY

## 2024-03-21 RX ORDER — VALACYCLOVIR HYDROCHLORIDE 1 G/1
TABLET, FILM COATED ORAL
Qty: 21 TABLET | Refills: 0 | Status: ACTIVE | COMMUNITY

## 2024-03-21 RX ORDER — GABAPENTIN 300 MG/1
TAKE 1 BY MOUTH 3 TIMES A DAY CAPSULE ORAL
Qty: 270 EACH | Refills: 1 | Status: ACTIVE | COMMUNITY

## 2024-03-21 RX ORDER — FLUTICASONE PROPIONATE 50 UG/1
SPRAY, METERED NASAL
Qty: 16 GRAM | Refills: 0 | Status: ACTIVE | COMMUNITY

## 2024-03-21 NOTE — HPI-PREVIOUS PROCEDURES
Endoscopic ultrasound Dr. Hollingsworth February 2024: Small esophageal varices in the distal esophagus, moderate portal hypertensive gastropathy, no gastric varices, normal duodenum-no specimens collected and no variceal banding performed. Endoscopic ultrasound noted findings consistent with chronic pancreatitis with several pancreatic calcifications, few small cystic lesions in the pancreas body and tail measuring up to 5 mm in size,-recommended follow-up MRI imaging in 6 months.  Colonoscopy February 2024, Dr. Huggins: Somewhat difficult colonoscopy due to significant looping. Abdominal pressure was not required. Quality of the prep was adequate to identify polyps. Polyps in the cecum x 2 4 mm and 10 mm resected with cold snare-tubular adenomas, 12 mm hepatic flexure adenoma and a 7 mm sigmoid colon adenoma, sigmoid diverticulosis, patent end-to-end colocolonic anastomosis present at 12 cm from the anal verge, grade 3 hemorrhoids-recall recommended in 3 years  Colonoscopy 2007-[place]-no report.

## 2024-03-21 NOTE — HPI-TODAY'S VISIT:
Have Your Spot(S) Been Treated In The Past?: has not been treated Virginia is here today in follow-up. Colonoscopy noted adenomatous polyps and she will be recommended a recall in 3 years. EGD/EUS done by Dr. Schwarz noted evidence of chronic pancreatitis but no other suspicious lesions. Etiology of her chronic pancreatitis is yet unclear. There is no history of alcohol excess. Medications may be playing a role here. She was taking Trulicity for 3 years. IgG4 came back normal. There is no history of hypertriglyceridemia. Imaging studies and EUS have not documented any ductal abnormalities like pancreas divisum. History of cholecystectomy. She denies any family history of pancreatitis. She reports feeling bloated and distended. "I feel like I am 8 months pregnant ". She denies any symptoms of diarrhea or weight loss. Denies abdominal pain. Denies rectal bleeding or melena.    history of compensated cirrhosis possibly secondary to Hinson. She had extensive serological workup which was reviewed-refer below. And unremarkable except for thrombocytopenia. Imaging studies note no ascites and there is evidence of chronic pancreatitis.  history of diverticulitis in 2007 that led to a segmental colon resection after colonoscopy.  Ultrasound January 2024 as noted below. Ultrasound elastography noted S3/F4     MRI in 2021 that was also suspicious for cirrhosis with no focal hepatic lesions and also noted was coarse calcifications of pancreas suggesting chronic pancreatitis. She recalls having 1 episode of pancreatitis a few years ago.  She blames Her diabetes and pancreatitis and now cirrhosis on COVID Is possible she developed diabetes from her chronic pancreatitis which then led to her fatty liver and nonalcoholic cirrhosis No family history of pancreatitis or cirrhosis. Hpi Title: Evaluation of Skin Lesions Year Removed: 1900

## 2024-03-21 NOTE — HPI-PREVIOUS LABS
Labs January 2024: IgG subclass 4 within normal range   Labs January 2024: WBC 5.9, hemoglobin 15 g, MCV 89, platelets 93 (L), Serum iron 76, TIBC 3 8520% saturation, ferritin 47 BUN 12 creatinine 0.87, Total bilirubin 0.7, AST 40 (H), ALT 42 (H), alkaline phosphatase 109, serum albumin 4.3, prothrombin time 11.0, INR 1.1, Alpha-fetoprotein 3.3, Autoimmune serologies including SHANEKA, AMA, ASMA, anti-LK M all unremarkable Immune to hepatitis A, hepatitis B and C negative Hemoglobin A1c 9.0 (H), normal lipid profile except for a HDL of 47 (L).   Labs August 2023: Normal electrolytes, normal renal function, serum albumin 4.3, total bilirubin 0.5, AST/ALT 40/50, alkaline phosphatase 153, serum albumin 4.3 Hemoglobin A1c 6.4, total cholesterol 128, triglycerides 212 (H), LDL 49, TSH 0.693 (normal), Labs March 2023: WBC 5.4, hemoglobin 14.4 g, MCV 89, platelets 94 (L)  Hep C antibody is nonreactive in 2021
No

## 2025-01-07 ENCOUNTER — TELEPHONE ENCOUNTER (OUTPATIENT)
Dept: URBAN - METROPOLITAN AREA CLINIC 63 | Facility: CLINIC | Age: 75
End: 2025-01-07

## 2025-01-07 RX ORDER — PANCRELIPASE 36000; 180000; 114000 [USP'U]/1; [USP'U]/1; [USP'U]/1
2 PILLS WITH MEAL AND 1 PILL WITH SNACK CAPSULE, DELAYED RELEASE PELLETS ORAL AS NEEDED
Qty: 540 | Refills: 1
Start: 2024-03-21 | End: 2025-07-06

## 2025-04-02 ENCOUNTER — OFFICE VISIT (OUTPATIENT)
Dept: URBAN - METROPOLITAN AREA CLINIC 63 | Facility: CLINIC | Age: 75
End: 2025-04-02

## 2025-04-02 ENCOUNTER — P2P PATIENT RECORD (OUTPATIENT)
Age: 75
End: 2025-04-02

## 2025-04-02 RX ORDER — LOSARTAN POTASSIUM 50 MG/1
TABLET, FILM COATED ORAL ONCE A DAY
Refills: 0 | COMMUNITY
Start: 2020-02-28

## 2025-04-02 RX ORDER — EPINEPHRINE 0.3 MG/.3ML
INJECT 1 (ONE) SYRINGE AS NEEDED FOR SEVERE REACTION INJECTION INTRAMUSCULAR
Qty: 2 EACH | Refills: 1 | COMMUNITY

## 2025-04-02 RX ORDER — EVOLOCUMAB 140 MG/ML
INJECT 1 ML INTO THE SKIN EVERY 14 DAYS INJECTION, SOLUTION SUBCUTANEOUS
Qty: 6 MILLILITER | Refills: 0 | COMMUNITY

## 2025-04-02 RX ORDER — MONTELUKAST SODIUM 10 MG/1
TAKE 1 TABLET BY MOUTH NIGHTLY TABLET, FILM COATED ORAL
Qty: 90 EACH | Refills: 1 | COMMUNITY

## 2025-04-02 RX ORDER — FLUTICASONE PROPIONATE 50 UG/1
SPRAY, METERED NASAL
Qty: 16 GRAM | Refills: 0 | COMMUNITY

## 2025-04-02 RX ORDER — ALBUTEROL SULFATE 90 UG/1
AEROSOL, METERED RESPIRATORY (INHALATION)
Refills: 0 | COMMUNITY
Start: 2020-02-28

## 2025-04-02 RX ORDER — MECLIZINE 12.5 MG/1
TAKE 1 TABLET BY MOUTH EVERY 6 HOURS AS NEEDED FOR DIZZINESS FOR UP TO 30 DAYS TABLET ORAL
Qty: 30 EACH | Refills: 0 | COMMUNITY

## 2025-04-02 RX ORDER — GABAPENTIN 300 MG/1
TAKE 1 BY MOUTH 3 TIMES A DAY CAPSULE ORAL
Qty: 270 EACH | Refills: 1 | COMMUNITY

## 2025-04-02 RX ORDER — OMEPRAZOLE 40 MG/1
CAPSULE, DELAYED RELEASE ORAL
Qty: 90 APPLICATOR | Refills: 1 | COMMUNITY

## 2025-04-02 RX ORDER — VALACYCLOVIR HYDROCHLORIDE 1 G/1
TABLET, FILM COATED ORAL
Qty: 21 TABLET | Refills: 0 | COMMUNITY

## 2025-04-02 RX ORDER — EMPAGLIFLOZIN 25 MG/1
1 TABLET TABLET, FILM COATED ORAL ONCE A DAY
COMMUNITY

## 2025-04-02 RX ORDER — PANCRELIPASE 36000; 180000; 114000 [USP'U]/1; [USP'U]/1; [USP'U]/1
2 PILLS WITH MEAL AND 1 PILL WITH SNACK CAPSULE, DELAYED RELEASE PELLETS ORAL AS NEEDED
Qty: 540 | Refills: 1 | COMMUNITY
Start: 2024-03-21 | End: 2025-07-06

## 2025-04-02 RX ORDER — BACLOFEN 20 MG/1
TABLET ORAL AS NEEDED
Refills: 0 | COMMUNITY
Start: 2020-02-28

## 2025-04-02 NOTE — PHYSICAL EXAM HENT:
Head,  normocephalic,  atraumatic,  Face,  Face within normal limits,  Ears,  External ears within normal limits,  Nose/Nasopharynx,  External nose  normal appearance,  nares patent,  no nasal discharge,  Mouth and Throat,  Oral cavity appearance normal,  Lips,  Appearance normal
Oriented - self; Oriented - place; Oriented - time

## 2025-04-02 NOTE — HPI-TODAY'S VISIT:
74-year-old female, patient of Dr. Huggins with hypertension, diabetes, asthma, complicated sigmoid diverticulitis s/p resection in 2007, GRIMES cirrhosis, chronic pancreatitis, GERD presents to the office for evaluation of abdominal pain.  She was last seen in the office in March 2024 by Dr. Huggins.  She reported abdominal bloating and distention at the time.  She had no other GI complaints.  She was referred to  gastroenterology at [facility] to try to determine the cause of chronic pancreatitis.  She had an EUS with Dr. Hollingsworth in February 2020 for which showed evidence of chronic pancreatitis but no other suspicious lesions or evidence of pancreas divisum.  She has no history of hypertriglyceridemia and IgG4 was normal.  She has no history of excess alcohol intake or a family history of any problems with the pancreas.  She had previously been on Trulicity for diabetes but that was discontinued.  We have not received any records from [facility].  She presents back to the office today She is currently on Creon 36K lipase units, 2 capsules with meals and 1 capsule with snacks. Virginia is here today in follow-up.    Ultrasound January 2024 as noted below. Ultrasound elastography noted S3/F4     MRI in 2021 that was also suspicious for cirrhosis with no focal hepatic lesions and also noted was coarse calcifications of pancreas suggesting chronic pancreatitis. She recalls having 1 episode of pancreatitis a few years ago.  She blames Her diabetes and pancreatitis and now cirrhosis on COVID Is possible she developed diabetes from her chronic pancreatitis which then led to her fatty liver and nonalcoholic cirrhosis No family history of pancreatitis or cirrhosis.

## 2025-04-02 NOTE — HPI-PREVIOUS LABS
Labs January 2024: IgG subclass 4 within normal range   Labs January 2024: WBC 5.9, hemoglobin 15 g, MCV 89, platelets 93 (L), Serum iron 76, TIBC 3 8520% saturation, ferritin 47 BUN 12 creatinine 0.87, Total bilirubin 0.7, AST 40 (H), ALT 42 (H), alkaline phosphatase 109, serum albumin 4.3, prothrombin time 11.0, INR 1.1, Alpha-fetoprotein 3.3, Autoimmune serologies including SHANEKA, AMA, ASMA, anti-LK M all unremarkable Immune to hepatitis A, hepatitis B and C negative Hemoglobin A1c 9.0 (H), normal lipid profile except for a HDL of 47 (L).   Labs August 2023: Normal electrolytes, normal renal function, serum albumin 4.3, total bilirubin 0.5, AST/ALT 40/50, alkaline phosphatase 153, serum albumin 4.3 Hemoglobin A1c 6.4, total cholesterol 128, triglycerides 212 (H), LDL 49, TSH 0.693 (normal), Labs March 2023: WBC 5.4, hemoglobin 14.4 g, MCV 89, platelets 94 (L)  Hep C antibody is nonreactive in 2021

## 2025-04-02 NOTE — HPI-PREVIOUS PROCEDURES
Endoscopic ultrasound Dr. Hollingsworth February 2024: Small esophageal varices in the distal esophagus, moderate portal hypertensive gastropathy, no gastric varices, normal duodenum-no specimens collected and no variceal banding performed. Endoscopic ultrasound noted findings consistent with chronic pancreatitis with several pancreatic calcifications, few small cystic lesions in the pancreas body and tail measuring up to 5 mm in size,-recommended follow-up MRI imaging in 6 months.  Colonoscopy February 2024, Dr. Huggins: Somewhat difficult colonoscopy due to significant looping. Abdominal pressure was not required. Quality of the prep was adequate to identify polyps. Polyps in the cecum x 2 4 mm and 10 mm resected with cold snare-tubular adenomas, 12 mm hepatic flexure adenoma and a 7 mm sigmoid colon adenoma, sigmoid diverticulosis, patent end-to-end colocolonic anastomosis present at 12 cm from the anal verge, grade 3 hemorrhoids-recall recommended in 3 years  Colonoscopy 2007-[place]-no report.

## 2025-04-09 ENCOUNTER — OFFICE VISIT (OUTPATIENT)
Dept: URBAN - METROPOLITAN AREA CLINIC 63 | Facility: CLINIC | Age: 75
End: 2025-04-09
Payer: COMMERCIAL

## 2025-04-09 ENCOUNTER — LAB OUTSIDE AN ENCOUNTER (OUTPATIENT)
Dept: URBAN - METROPOLITAN AREA CLINIC 63 | Facility: CLINIC | Age: 75
End: 2025-04-09

## 2025-04-09 ENCOUNTER — DASHBOARD ENCOUNTERS (OUTPATIENT)
Age: 75
End: 2025-04-09

## 2025-04-09 DIAGNOSIS — K21.9 ACID REFLUX: ICD-10-CM

## 2025-04-09 DIAGNOSIS — R10.10 UPPER ABDOMINAL PAIN: ICD-10-CM

## 2025-04-09 DIAGNOSIS — R93.3 GASTROINTESTINAL TRACT IMAGING ABNORMALITY: ICD-10-CM

## 2025-04-09 DIAGNOSIS — K86.1 IDIOPATHIC CHRONIC PANCREATITIS: ICD-10-CM

## 2025-04-09 DIAGNOSIS — Z86.0101 PERSONAL HISTORY OF ADENOMATOUS AND SERRATED COLON POLYPS: ICD-10-CM

## 2025-04-09 DIAGNOSIS — R13.19 ESOPHAGEAL DYSPHAGIA: ICD-10-CM

## 2025-04-09 DIAGNOSIS — K74.69 OTHER CIRRHOSIS OF LIVER: ICD-10-CM

## 2025-04-09 DIAGNOSIS — Z90.49 STATUS POST PARTIAL RESECTION OF COLON: ICD-10-CM

## 2025-04-09 PROCEDURE — 99214 OFFICE O/P EST MOD 30 MIN: CPT

## 2025-04-09 RX ORDER — MECLIZINE 12.5 MG/1
TAKE 1 TABLET BY MOUTH EVERY 6 HOURS AS NEEDED FOR DIZZINESS FOR UP TO 30 DAYS TABLET ORAL
Qty: 30 EACH | Refills: 0 | Status: ACTIVE | COMMUNITY

## 2025-04-09 RX ORDER — FLUTICASONE PROPIONATE 50 UG/1
SPRAY, METERED NASAL
Qty: 16 GRAM | Refills: 0 | Status: ACTIVE | COMMUNITY

## 2025-04-09 RX ORDER — ALBUTEROL SULFATE 90 UG/1
AEROSOL, METERED RESPIRATORY (INHALATION)
Refills: 0 | Status: ACTIVE | COMMUNITY
Start: 2020-02-28

## 2025-04-09 RX ORDER — PANCRELIPASE 36000; 180000; 114000 [USP'U]/1; [USP'U]/1; [USP'U]/1
2 PILLS WITH MEAL AND 1 PILL WITH SNACK CAPSULE, DELAYED RELEASE PELLETS ORAL AS NEEDED
Qty: 540 | Refills: 1 | Status: ACTIVE | COMMUNITY
Start: 2024-03-21 | End: 2025-07-06

## 2025-04-09 RX ORDER — GABAPENTIN 300 MG/1
TAKE 1 BY MOUTH 3 TIMES A DAY CAPSULE ORAL
Qty: 270 EACH | Refills: 1 | Status: ACTIVE | COMMUNITY

## 2025-04-09 RX ORDER — VALACYCLOVIR HYDROCHLORIDE 1 G/1
TABLET, FILM COATED ORAL
Qty: 21 TABLET | Refills: 0 | Status: ACTIVE | COMMUNITY

## 2025-04-09 RX ORDER — EPINEPHRINE 0.3 MG/.3ML
INJECT 1 (ONE) SYRINGE AS NEEDED FOR SEVERE REACTION INJECTION INTRAMUSCULAR
Qty: 2 EACH | Refills: 1 | Status: ACTIVE | COMMUNITY

## 2025-04-09 RX ORDER — BACLOFEN 20 MG/1
TABLET ORAL AS NEEDED
Refills: 0 | Status: ACTIVE | COMMUNITY
Start: 2020-02-28

## 2025-04-09 RX ORDER — OMEPRAZOLE 40 MG/1
CAPSULE, DELAYED RELEASE ORAL
Qty: 90 APPLICATOR | Refills: 1 | Status: ACTIVE | COMMUNITY

## 2025-04-09 RX ORDER — EVOLOCUMAB 140 MG/ML
INJECT 1 ML INTO THE SKIN EVERY 14 DAYS INJECTION, SOLUTION SUBCUTANEOUS
Qty: 6 MILLILITER | Refills: 0 | Status: ACTIVE | COMMUNITY

## 2025-04-09 RX ORDER — LOSARTAN POTASSIUM 50 MG/1
TABLET, FILM COATED ORAL ONCE A DAY
Refills: 0 | Status: ACTIVE | COMMUNITY
Start: 2020-02-28

## 2025-04-09 RX ORDER — MONTELUKAST SODIUM 10 MG/1
TAKE 1 TABLET BY MOUTH NIGHTLY TABLET, FILM COATED ORAL
Qty: 90 EACH | Refills: 1 | Status: ACTIVE | COMMUNITY

## 2025-04-09 NOTE — HPI-PREVIOUS PROCEDURES
Colonoscopy at [facility] with Dr. Huggins 2/7/2024 colonoscopy difficult due to significant looping bowel prep adequate Consistent with nonthrombosed external and internal hemorrhoids (grade 3) 4 mm polyp in cecum 10 mm polyp in cecum 12 mm polyp at hepatic flexure 7 mm polyp at sigmoid colon Diverticulosis in sigmoid colon Patent end to end colocolonic anastomosis with healthy-appearing mucosa All polyps consistent with tubular adenoma  Endoscopic ultrasound Dr. Hollingsworth February 2024: Small esophageal varices in the distal esophagus, moderate portal hypertensive gastropathy, no gastric varices, normal duodenum-no specimens collected and no variceal banding performed. Endoscopic ultrasound noted findings consistent with chronic pancreatitis with several pancreatic calcifications, few small cystic lesions in the pancreas body and tail measuring up to 5 mm in size,-recommended follow-up MRI imaging in 6 months.   Colonoscopy 2007-[place]-no report.

## 2025-04-09 NOTE — HPI-ZZZTODAY'S VISIT
Patient is a very pleasant 74-year-old female who presents for colonoscopy screening.  She is a patient of Dr. Huggins.  Last seen 3/1/2024.  Past medical history significant for adenomatous colon polyps, chronic pancreatitis, history of compensated cirrhosis secondary to GRIMES, GERD, hypertension, diabetes, asthma, recurrent sinusitis, complicated sigmoid diverticulitis with colon resection 2007.  Past surgical history reviewed.  Last colonoscopy 2/7/2024.  She was last seen in the office in March 2024 by Dr. Huggins. She reported abdominal bloating and distention at the time. She had no other GI complaints. She was referred to gastroenterology at [facility] to try to determine the cause of chronic pancreatitis. She had an EUS with Dr. Hollingsworth in February 2024 which showed evidence of chronic pancreatitis but no other suspicious lesions or evidence of pancreas divisum. She has no history of hypertriglyceridemia and IgG4 was normal. She has no history of excess alcohol intake or a family history of any problems with the pancreas. She had previously been on Trulicity for diabetes but that was discontinued. She is currently on Creon 36K lipase units, 2 capsules with meals and 1 capsule with snacks. Ultrasound January 2024 as noted below. Ultrasound elastography noted S3/F4  MRI in 2021 that was also suspicious for cirrhosis with no focal hepatic lesions and also noted was coarse calcifications of pancreas suggesting chronic pancreatitis. She recalls having 1 episode of pancreatitis a few years ago.  She blames Her diabetes and pancreatitis and now cirrhosis on COVID Is possible she developed diabetes from her chronic pancreatitis which then led to her fatty liver and nonalcoholic cirrhosis No family history of pancreatitis or cirrhosis.  Patient presents with her  for complaint of dysphagia.  She relays for several months she has had progressive solid and liquid dysphagia.  She has no difficulty initiating swallow but after swallowing she feels as though her food sits in her sternum for some time prior to going down into her stomach.  She will get pain that radiates to her back when this happens.  She has early satiety as well.  She has never had anything like this previously and has not had a workup for this.  She has chronic acid reflux for which she takes omeprazole 40 mg with good efficacy.  She denies requiring Heimlich maneuver.  She denies dyspepsia, breakthrough symptoms of pyrosis with omeprazole use, unintentional weight loss, melena, hematochezia, change in bowel habits.  She admits her mom had dysphagia when she was older as well.  She is curious if there is any correlation. In regard to her pancreatitis history and cirrhosis, she admits she was unable to establish with tertiary care.  She had to reschedule 1 time due to her  having an appointment at the same time and was unable to get an appointment again because they were continuously booked out for many months at a time.  She also  states the study was published with [employer] corroborating her theory that COVID may be a precursor to pancreatitis and cirrhosis.  Patient denies jaundice, pruritus, change in mentation, increased confusion, abdominal distention, recent weight gain, or sleep disturbances.

## 2025-05-07 ENCOUNTER — LAB OUTSIDE AN ENCOUNTER (OUTPATIENT)
Dept: URBAN - METROPOLITAN AREA CLINIC 63 | Facility: CLINIC | Age: 75
End: 2025-05-07

## 2025-07-17 ENCOUNTER — P2P PATIENT RECORD (OUTPATIENT)
Age: 75
End: 2025-07-17

## 2025-08-12 ENCOUNTER — TELEPHONE ENCOUNTER (OUTPATIENT)
Dept: URBAN - METROPOLITAN AREA CLINIC 63 | Facility: CLINIC | Age: 75
End: 2025-08-12